# Patient Record
Sex: FEMALE | Race: WHITE | NOT HISPANIC OR LATINO | URBAN - METROPOLITAN AREA
[De-identification: names, ages, dates, MRNs, and addresses within clinical notes are randomized per-mention and may not be internally consistent; named-entity substitution may affect disease eponyms.]

---

## 2019-12-16 ENCOUNTER — OFFICE (OUTPATIENT)
Dept: URBAN - METROPOLITAN AREA CLINIC 101 | Facility: CLINIC | Age: 74
End: 2019-12-16

## 2019-12-16 VITALS — HEIGHT: 63 IN

## 2019-12-16 DIAGNOSIS — R15.9 FULL INCONTINENCE OF FECES: ICD-10-CM

## 2019-12-16 DIAGNOSIS — Z86.010 PERSONAL HISTORY OF COLONIC POLYPS: ICD-10-CM

## 2019-12-16 PROCEDURE — 99204 OFFICE O/P NEW MOD 45 MIN: CPT

## 2019-12-16 NOTE — SERVICEHPINOTES
CAROLINA DSOUZA   is a   74  female who presents prior to colonoscopy. Last colonoscopy was done in 2013 with hx or personal colon polyps. The colonoscopy was unremarkable other than prior mucosal tattooing in the distal ascending colon and internal hemorrhoids.  [place] 1.5 cm [place] [facility] [place] report to review at this time. BRMoves bowel daily on BSS type 4. Denies blood in stool, melena, constipation, diarrhea or lower abdominal pain. BROccasional fecal incontinences with solids stools.  BRDenies nausea, vomiting, dysphagia, dyspepsia, early satiety or weight loss. BROccasional heartburn which dietary related. Takes OTC Nexium or TUMS prn.Denies family hx of colon cancer. BRDenies chest pain with exertion. BRsob with exertion which attributes to COPD.   Hx of alcoholism, sober since 1983.  Takes Aleve approx. 3 times a week. It does not bother her stomach. BR

## 2020-02-04 ENCOUNTER — ON CAMPUS - OUTPATIENT (OUTPATIENT)
Dept: URBAN - METROPOLITAN AREA HOSPITAL 35 | Facility: HOSPITAL | Age: 75
End: 2020-02-04

## 2020-02-04 DIAGNOSIS — Z86.010 PERSONAL HISTORY OF COLONIC POLYPS: ICD-10-CM

## 2020-02-04 DIAGNOSIS — D12.0 BENIGN NEOPLASM OF CECUM: ICD-10-CM

## 2020-02-04 PROCEDURE — 45380 COLONOSCOPY AND BIOPSY: CPT | Mod: PT
